# Patient Record
Sex: FEMALE | Race: WHITE | NOT HISPANIC OR LATINO | Employment: UNEMPLOYED | URBAN - METROPOLITAN AREA
[De-identification: names, ages, dates, MRNs, and addresses within clinical notes are randomized per-mention and may not be internally consistent; named-entity substitution may affect disease eponyms.]

---

## 2022-05-09 ENCOUNTER — TELEPHONE (OUTPATIENT)
Dept: GASTROENTEROLOGY | Facility: CLINIC | Age: 57
End: 2022-05-09
Payer: COMMERCIAL

## 2022-05-09 NOTE — TELEPHONE ENCOUNTER
----- Message from Mandi Malagon sent at 5/9/2022  2:12 PM CDT -----  Contact: PT  .Type:  Needs Medical Advice    Who Called:  Antione  Symptoms (please be specific):  abdominal/side pain    Would the patient rather a call back or a response via [facility]?  Callback   Best Call Back Number:  [phone] (home)     Additional Information:  former pt at [facility]- needed to know when last colonscopy appt and needed to schedule colonoscopy soon

## 2022-05-19 ENCOUNTER — TELEPHONE (OUTPATIENT)
Dept: GASTROENTEROLOGY | Facility: CLINIC | Age: 57
End: 2022-05-19
Payer: COMMERCIAL

## 2022-05-19 NOTE — TELEPHONE ENCOUNTER
----- Message from Marcella Boles sent at 5/19/2022  8:40 AM CDT -----  Regarding: DR ROJAS   Patient calling to schedule colonoscopy. Patient need to know when was her last colonoscopy. Call back number [phone] (home)

## 2023-01-05 ENCOUNTER — TELEPHONE (OUTPATIENT)
Dept: GASTROENTEROLOGY | Facility: CLINIC | Age: 58
End: 2023-01-05
Payer: COMMERCIAL

## 2023-01-05 DIAGNOSIS — R19.4 ALTERED BOWEL HABITS: ICD-10-CM

## 2023-01-05 DIAGNOSIS — N73.6 PELVIC ADHESIONS: Primary | ICD-10-CM

## 2023-01-05 NOTE — TELEPHONE ENCOUNTER
Pt called in to inquire when next colonoscopy is due,  Recall date in [facility] is 10/18/2023.  Pt states that she has been having swelling right above her naval. This has been ongoing since her last GI visit.   When she sits she feels like there is something stuck there.  Pt takes miralax daily and states she sometimes has diarrhea. She does hold miralax when this happens but if she doesn't start back taking it she has stomach pain.  Please advise.

## 2023-01-05 NOTE — TELEPHONE ENCOUNTER
----- Message from Lara oMck sent at 1/5/2023  8:32 AM CST -----  Regarding: pt advice  Contact: pt  Pt is calling to find out when she had her last colonoscopy. Please call back at [phone]//thank you acc

## 2023-01-06 NOTE — TELEPHONE ENCOUNTER
This patient has known significant scar tissue in her pelvis as a main source of her GI Sx. Make next available back up OV with me but I recommend a referral to PT for pelvic adhesions/health. Okay to send if she agrees. Hopefully she has completed the course of therapy by the time I see her in clinic and we can then schedule her repeat colonoscopy.  NBP

## 2023-08-14 ENCOUNTER — TELEPHONE (OUTPATIENT)
Dept: GASTROENTEROLOGY | Facility: CLINIC | Age: 58
End: 2023-08-14

## 2023-08-14 ENCOUNTER — OFFICE VISIT (OUTPATIENT)
Dept: GASTROENTEROLOGY | Facility: CLINIC | Age: 58
End: 2023-08-14
Payer: COMMERCIAL

## 2023-08-14 VITALS
HEIGHT: 66 IN | OXYGEN SATURATION: 99 % | SYSTOLIC BLOOD PRESSURE: 144 MMHG | WEIGHT: 142 LBS | HEART RATE: 74 BPM | BODY MASS INDEX: 22.82 KG/M2 | DIASTOLIC BLOOD PRESSURE: 67 MMHG

## 2023-08-14 DIAGNOSIS — R10.13 EPIGASTRIC PAIN: ICD-10-CM

## 2023-08-14 DIAGNOSIS — R10.32 LEFT LOWER QUADRANT PAIN: ICD-10-CM

## 2023-08-14 DIAGNOSIS — N73.6 PELVIC ADHESIONS: ICD-10-CM

## 2023-08-14 DIAGNOSIS — K66.0 ADHESION OF INTESTINE: Primary | ICD-10-CM

## 2023-08-14 DIAGNOSIS — Z86.010 HISTORY OF COLON POLYPS: ICD-10-CM

## 2023-08-14 DIAGNOSIS — R10.31 RIGHT LOWER QUADRANT PAIN: ICD-10-CM

## 2023-08-14 DIAGNOSIS — Z80.0 FAMILY HISTORY OF COLON CANCER: ICD-10-CM

## 2023-08-14 PROCEDURE — 99214 PR OFFICE/OUTPT VISIT, EST, LEVL IV, 30-39 MIN: ICD-10-PCS | Mod: S$GLB,,, | Performed by: INTERNAL MEDICINE

## 2023-08-14 PROCEDURE — 3008F PR BODY MASS INDEX (BMI) DOCUMENTED: ICD-10-PCS | Mod: CPTII,S$GLB,, | Performed by: INTERNAL MEDICINE

## 2023-08-14 PROCEDURE — 1159F MED LIST DOCD IN RCRD: CPT | Mod: CPTII,S$GLB,, | Performed by: INTERNAL MEDICINE

## 2023-08-14 PROCEDURE — 3077F SYST BP >= 140 MM HG: CPT | Mod: CPTII,S$GLB,, | Performed by: INTERNAL MEDICINE

## 2023-08-14 PROCEDURE — 3077F PR MOST RECENT SYSTOLIC BLOOD PRESSURE >= 140 MM HG: ICD-10-PCS | Mod: CPTII,S$GLB,, | Performed by: INTERNAL MEDICINE

## 2023-08-14 PROCEDURE — 1160F PR REVIEW ALL MEDS BY PRESCRIBER/CLIN PHARMACIST DOCUMENTED: ICD-10-PCS | Mod: CPTII,S$GLB,, | Performed by: INTERNAL MEDICINE

## 2023-08-14 PROCEDURE — 3078F DIAST BP <80 MM HG: CPT | Mod: CPTII,S$GLB,, | Performed by: INTERNAL MEDICINE

## 2023-08-14 PROCEDURE — 3078F PR MOST RECENT DIASTOLIC BLOOD PRESSURE < 80 MM HG: ICD-10-PCS | Mod: CPTII,S$GLB,, | Performed by: INTERNAL MEDICINE

## 2023-08-14 PROCEDURE — 1159F PR MEDICATION LIST DOCUMENTED IN MEDICAL RECORD: ICD-10-PCS | Mod: CPTII,S$GLB,, | Performed by: INTERNAL MEDICINE

## 2023-08-14 PROCEDURE — 3008F BODY MASS INDEX DOCD: CPT | Mod: CPTII,S$GLB,, | Performed by: INTERNAL MEDICINE

## 2023-08-14 PROCEDURE — 1160F RVW MEDS BY RX/DR IN RCRD: CPT | Mod: CPTII,S$GLB,, | Performed by: INTERNAL MEDICINE

## 2023-08-14 PROCEDURE — 99214 OFFICE O/P EST MOD 30 MIN: CPT | Mod: S$GLB,,, | Performed by: INTERNAL MEDICINE

## 2023-08-14 RX ORDER — SOD SULF/POT CHLORIDE/MAG SULF 1.479 G
12 TABLET ORAL DAILY
Qty: 24 TABLET | Refills: 0 | Status: SHIPPED | OUTPATIENT
Start: 2023-08-14 | End: 2024-01-18

## 2023-08-14 RX ORDER — ESTRADIOL 1 MG/1
3 TABLET ORAL
COMMUNITY
Start: 2023-05-29

## 2023-08-14 RX ORDER — LEVALBUTEROL TARTRATE 45 UG/1
2 AEROSOL, METERED ORAL EVERY 4 HOURS PRN
COMMUNITY
Start: 2023-05-17 | End: 2023-08-14

## 2023-08-14 RX ORDER — MONTELUKAST SODIUM 10 MG/1
10 TABLET ORAL
COMMUNITY
Start: 2023-06-05 | End: 2023-08-14

## 2023-08-14 NOTE — PROGRESS NOTES
Clinic Note    Reason for visit:  The primary encounter diagnosis was Adhesion of intestine. Diagnoses of Pelvic adhesions, Left lower quadrant pain, Right lower quadrant pain, Family history of colon cancer, History of colon polyps, and Epigastric pain were also pertinent to this visit.    PCP: Howard Sahu.       HPI:  This is a 58 y.o. female who was last seen in 2021. Patient with significant scar tissue in her pelvis as a main source of her GI symptoms. She was referred to PT for pelvic adhesions/health but the waiting list was one year so she has not seen them yet. She takes MiraLAX daily and has daily BM, loose stool. She states RLQ abdominal pain that is intermittent. Comes and goes that is chronic. She states for the last month, she has had LLQ pain that comes and goes. Feels like a knot. She also reports intermittent bloating and pressure in epigastric area.         11/2020 CTE: no GI, mild distension of colon to sigmoid.    Colonoscopy 10/18/2018: Extrinsic adhesions in pelvis. Normal mucosa was noted in the entire colon. Colonoscopy with propofol in 5 years.     Review of Systems   Constitutional:  Negative for fatigue, fever and unexpected weight change.   HENT:  Negative for mouth sores, postnasal drip, sore throat and trouble swallowing.    Eyes:  Negative for pain, discharge and eye dryness.   Respiratory:  Negative for apnea, cough, choking, chest tightness, shortness of breath and wheezing.    Cardiovascular:  Negative for chest pain, palpitations and leg swelling.   Gastrointestinal:  Positive for abdominal distention and abdominal pain. Negative for anal bleeding, blood in stool, change in bowel habit, constipation, diarrhea, nausea, rectal pain, vomiting, reflux, fecal incontinence and change in bowel habit.   Genitourinary:  Negative for bladder incontinence, dysuria and hematuria.   Musculoskeletal:  Negative for arthralgias, back pain and joint swelling.   Integumentary:  Negative for  "color change and rash.   Allergic/Immunologic: Negative for environmental allergies and food allergies.   Neurological:  Negative for seizures and headaches.   Hematological:  Negative for adenopathy. Does not bruise/bleed easily.        Past Medical History:   Diagnosis Date    Abdominal adhesions     History of bowel obstruction 2008    Hormone replacement therapy (HRT)     Personal history of colonic polyps      Past Surgical History:   Procedure Laterality Date    BILATERAL TUBAL LIGATION      CHOLECYSTECTOMY      HYSTERECTOMY      LYSIS OF ADHESIONS      LLQ and RLQ     Family History   Problem Relation Age of Onset    Lymphoma Mother     Breast cancer Mother     Colon cancer Father 55     Social History     Tobacco Use    Smoking status: Never    Smokeless tobacco: Never   Substance Use Topics    Alcohol use: Never    Drug use: Never     Review of patient's allergies indicates:  No Known Allergies     Medication List with Changes/Refills   New Medications    SOD SULF-POT CHLORIDE-MAG SULF (SUTAB) 1.479-0.188- 0.225 GRAM TABLET    Take 12 tablets by mouth once daily. Take according to package instructions with indicated amount of water. No breakfast day before test. May substitute with Suprep, Clenpiq, Plenvu, Moviprep or GoLytely based on Rx plan and patient preference.   Current Medications    ESTRADIOL (ESTRACE) 1 MG TABLET    Take 3 mg by mouth.   Discontinued Medications    LEVALBUTEROL (XOPENEX HFA) 45 MCG/ACTUATION INHALER    Inhale 2 puffs into the lungs every 4 (four) hours as needed.    MONTELUKAST (SINGULAIR) 10 MG TABLET    Take 10 mg by mouth.         Vital Signs:  BP (!) 144/67 (BP Location: Right arm, Patient Position: Sitting, BP Method: Medium (Automatic))   Pulse 74   Ht 5' 6" (1.676 m)   Wt 64.4 kg (142 lb)   SpO2 99%   BMI 22.92 kg/m²         Physical Exam  Vitals reviewed.   Constitutional:       General: She is awake. She is not in acute distress.     Appearance: Normal appearance. " She is well-developed. She is not ill-appearing, toxic-appearing or diaphoretic.   HENT:      Head: Normocephalic and atraumatic.      Nose: Nose normal.      Mouth/Throat:      Mouth: Mucous membranes are moist.      Pharynx: Oropharynx is clear. No oropharyngeal exudate or posterior oropharyngeal erythema.   Eyes:      General: Lids are normal. Gaze aligned appropriately. No scleral icterus.        Right eye: No discharge.         Left eye: No discharge.      Conjunctiva/sclera: Conjunctivae normal.   Neck:      Trachea: Trachea normal.   Cardiovascular:      Rate and Rhythm: Normal rate and regular rhythm.      Pulses:           Radial pulses are 2+ on the right side and 2+ on the left side.   Pulmonary:      Effort: Pulmonary effort is normal. No respiratory distress.      Breath sounds: No stridor. No wheezing.   Chest:      Chest wall: No tenderness.   Abdominal:      General: Bowel sounds are normal. There is no distension.      Palpations: Abdomen is soft. There is no fluid wave, hepatomegaly or mass.      Tenderness: There is no abdominal tenderness. There is no guarding or rebound.   Musculoskeletal:         General: No tenderness or deformity.      Cervical back: Full passive range of motion without pain and neck supple. No tenderness.      Right lower leg: No edema.      Left lower leg: No edema.   Lymphadenopathy:      Cervical: No cervical adenopathy.   Skin:     General: Skin is warm and dry.      Capillary Refill: Capillary refill takes less than 2 seconds.      Coloration: Skin is not cyanotic, jaundiced or pale.   Neurological:      General: No focal deficit present.      Mental Status: She is alert and oriented to person, place, and time.      Motor: No tremor.   Psychiatric:         Attention and Perception: Attention normal.         Mood and Affect: Mood and affect normal.         Speech: Speech normal.         Behavior: Behavior normal. Behavior is cooperative.            All of the data above  and below has been reviewed by myself and any further interpretations will be reflected in the assessment and plan.   The data includes review of external notes, and independent interpretation of lab results, procedures, x-rays, and imaging reports.      Assessment:  Adhesion of intestine  -     Ambulatory referral/consult to Physical/Occupational Therapy; Future; Expected date: 08/21/2023    Pelvic adhesions  -     Ambulatory referral/consult to Physical/Occupational Therapy; Future; Expected date: 08/21/2023    Left lower quadrant pain  -     Ambulatory Referral to External Surgery    Right lower quadrant pain  -     Ambulatory referral/consult to Physical/Occupational Therapy; Future; Expected date: 08/21/2023    Family history of colon cancer    History of colon polyps  -     Ambulatory Referral to External Surgery  -     sod sulf-pot chloride-mag sulf (SUTAB) 1.479-0.188- 0.225 gram tablet; Take 12 tablets by mouth once daily. Take according to package instructions with indicated amount of water. No breakfast day before test. May substitute with Suprep, Clenpiq, Plenvu, Moviprep or GoLytely based on Rx plan and patient preference.  Dispense: 24 tablet; Refill: 0    Epigastric pain  Comments:  newer, plan for EGD with colonoscopy  Orders:  -     Ambulatory Referral to External Surgery       Adhesion of intestine: appreciated on colonoscopy in 2018 to a significant degree, CTE 2020 some proximal dilation to sigmoid.   Right lower quadrant pain. Rec pelvic floor therapy.   Left lower quadrant pain  Screening for malignant neoplasms of colon: high risk. Due 10/2023  Family history of colon cancer     Recommendations:  Schedule upper endoscopy and colonoscopy.  We will send a referral to [facility].     Risks, benefits, and alternatives of medical management, any associated procedures, and/or treatment discussed with the patient. Patient given opportunity to ask questions and voices understanding.  Patient has elected to proceed with the recommended care modalities as discussed.    Instructed patient to notify my office if they have not been contacted within two weeks after any procedures, submitting any samples (biopsies, blood, stool, urine, etc.) or after any imaging (X-ray, CT, MRI, etc.).     Follow up in about 6 months (around 2/14/2024).    Order summary:  Orders Placed This Encounter   Procedures    Ambulatory referral/consult to Physical/Occupational Therapy    Ambulatory Referral to External Surgery      This assessment, plan, and documentation was performed in collaboration with Crissy Mott NP.      This document may have been created using a voice recognition transcribing system. Incorrect words or phrases may have been missed during proofreading. Please interpret accordingly or contact me for clarification.     Estefanía Cruz MD

## 2023-08-14 NOTE — LETTER
August 14, 2023        Howard Sahu MD  [address]  [place] [address]             [facility]  [address] [address]  Phone: [phone]  Fax: [phone]   Patient: Antione Nix   MR Number: [id number]   YOB: 1965   Date of Visit: 8/14/2023       Dear Dr. Sahu:    Thank you for referring Antione Nix to me for evaluation. Attached you will find relevant portions of my assessment and plan of care.    If you have questions, please do not hesitate to call me. I look forward to following Antione Nix along with you.    Sincerely,      Estefanía Cruz MD            CC  No Recipients    Enclosure

## 2023-08-14 NOTE — TELEPHONE ENCOUNTER
Per NBP scheduled pt at [facility] on 10/16/23 (Monday). Dottie      Faxed over pelvic therapy order with OV note with colonoscopy report to [facility].  [email or web address]

## 2023-08-14 NOTE — PATIENT INSTRUCTIONS
Schedule upper endoscopy and colonoscopy.  We will send a referral to [facility].     Please notify my office if you have not been contacted within two weeks after any procedures, submitting any samples (biopsies, blood, stool, urine, etc.) or after any imaging (X-ray, CT, MRI, etc.).

## 2023-10-06 ENCOUNTER — TELEPHONE (OUTPATIENT)
Dept: GASTROENTEROLOGY | Facility: CLINIC | Age: 58
End: 2023-10-06
Payer: COMMERCIAL

## 2023-10-06 VITALS — WEIGHT: 142 LBS | BODY MASS INDEX: 22.82 KG/M2 | HEIGHT: 66 IN

## 2023-10-06 DIAGNOSIS — R10.13 EPIGASTRIC PAIN: ICD-10-CM

## 2023-10-06 DIAGNOSIS — R10.32 LEFT LOWER QUADRANT PAIN: ICD-10-CM

## 2023-10-06 DIAGNOSIS — Z86.010 HISTORY OF COLON POLYPS: Primary | ICD-10-CM

## 2023-10-06 NOTE — TELEPHONE ENCOUNTER
"[facility]  [address] [address]  Phone: [phone]  Fax: [phone]    History & Physical         Provider: Dr. Estefanía Cruz    Patient Name: Antione CRONIN (age):1965  58 y.o.           Gender: female   Phone: [phone]     Referring Physician: Howard Sahu     Vital Signs:   Height - 5' 6"  Weight - 142 lb  BMI -  22.92    Plan: EGD/Colonoscopy @ [facility]    Encounter Diagnoses   Name Primary?    History of colon polyps Yes    Left lower quadrant pain     Epigastric pain            History:      Past Medical History:   Diagnosis Date    Abdominal adhesions     History of bowel obstruction     Hormone replacement therapy (HRT)     Personal history of colonic polyps       Past Surgical History:   Procedure Laterality Date    BILATERAL TUBAL LIGATION      CHOLECYSTECTOMY      HYSTERECTOMY      LYSIS OF ADHESIONS      LLQ and RLQ      Medication List with Changes/Refills   Current Medications    ESTRADIOL (ESTRACE) 1 MG TABLET    Take 3 mg by mouth.    SOD SULF-POT CHLORIDE-MAG SULF (SUTAB) 1.479-0.188- 0.225 GRAM TABLET    Take 12 tablets by mouth once daily. Take according to package instructions with indicated amount of water. No breakfast day before test. May substitute with Suprep, Clenpiq, Plenvu, Moviprep or GoLytely based on Rx plan and patient preference.      Review of patient's allergies indicates:  No Known Allergies   Family History   Problem Relation Age of Onset    Lymphoma Mother     Breast cancer Mother     Colon cancer Father 55      Social History     Tobacco Use    Smoking status: Never    Smokeless tobacco: Never   Substance Use Topics    Alcohol use: Never    Drug use: Never        Physical Examination:     General Appearance:___________________________  HEENT: " _____________________________________  Abdomen:____________________________________  Heart:________________________________________  Lungs:_______________________________________  Extremities:___________________________________  Skin:_________________________________________  Endocrine:____________________________________  Genitourinary:_________________________________  Neurological:__________________________________      Patient has been evaluated immediately prior to sedation and is medically cleared for endoscopy with IVCS as an ASA class: ______      Physician Signature: _________________________       Date: ________  Time: ________

## 2023-10-16 ENCOUNTER — OUTSIDE PLACE OF SERVICE (OUTPATIENT)
Dept: GASTROENTEROLOGY | Facility: CLINIC | Age: 58
End: 2023-10-16

## 2023-10-16 LAB — CRC RECOMMENDATION EXT: NORMAL

## 2023-10-16 PROCEDURE — 43239 EGD BIOPSY SINGLE/MULTIPLE: CPT | Mod: 51,,, | Performed by: INTERNAL MEDICINE

## 2023-10-16 PROCEDURE — 45385 PR COLONOSCOPY,REMV LESN,SNARE: ICD-10-PCS | Mod: ,,, | Performed by: INTERNAL MEDICINE

## 2023-10-16 PROCEDURE — 43239 PR EGD, FLEX, W/BIOPSY, SGL/MULTI: ICD-10-PCS | Mod: 51,,, | Performed by: INTERNAL MEDICINE

## 2023-10-16 PROCEDURE — 45385 COLONOSCOPY W/LESION REMOVAL: CPT | Mod: ,,, | Performed by: INTERNAL MEDICINE

## 2023-10-17 ENCOUNTER — TELEPHONE (OUTPATIENT)
Dept: GASTROENTEROLOGY | Facility: CLINIC | Age: 58
End: 2023-10-17
Payer: COMMERCIAL

## 2023-10-17 LAB — SPECIMEN TO PATHOLOGY: NORMAL

## 2023-10-17 NOTE — TELEPHONE ENCOUNTER
DBx nl, GBx react w/mini chr inact gitis w/o Hp, 1 TA, repeat colonoscopy in 5 years.     Notify patient. No signs of infection, precancerous cells or Celiac disease on the upper endoscopy biopsies.  Her colon polyp was benign. Repeat colonoscopy in 5 years.  Confirm recall tab in appointment desk is up-to-date (update if needed).  NBP

## 2023-11-07 ENCOUNTER — DOCUMENTATION ONLY (OUTPATIENT)
Dept: GASTROENTEROLOGY | Facility: CLINIC | Age: 58
End: 2023-11-07
Payer: COMMERCIAL

## 2023-12-14 ENCOUNTER — TELEPHONE (OUTPATIENT)
Dept: GASTROENTEROLOGY | Facility: CLINIC | Age: 58
End: 2023-12-14

## 2024-01-18 ENCOUNTER — OFFICE VISIT (OUTPATIENT)
Dept: GASTROENTEROLOGY | Facility: CLINIC | Age: 59
End: 2024-01-18
Payer: COMMERCIAL

## 2024-01-18 VITALS
HEIGHT: 66 IN | SYSTOLIC BLOOD PRESSURE: 111 MMHG | HEART RATE: 71 BPM | DIASTOLIC BLOOD PRESSURE: 75 MMHG | WEIGHT: 144.19 LBS | BODY MASS INDEX: 23.17 KG/M2 | OXYGEN SATURATION: 99 %

## 2024-01-18 DIAGNOSIS — K66.0 ADHESION OF INTESTINE: Primary | ICD-10-CM

## 2024-01-18 DIAGNOSIS — Z86.010 HISTORY OF COLON POLYPS: ICD-10-CM

## 2024-01-18 DIAGNOSIS — Z80.0 FAMILY HISTORY OF COLON CANCER: ICD-10-CM

## 2024-01-18 PROCEDURE — 3074F SYST BP LT 130 MM HG: CPT | Mod: CPTII,S$GLB,, | Performed by: INTERNAL MEDICINE

## 2024-01-18 PROCEDURE — 1160F RVW MEDS BY RX/DR IN RCRD: CPT | Mod: CPTII,S$GLB,, | Performed by: INTERNAL MEDICINE

## 2024-01-18 PROCEDURE — 1159F MED LIST DOCD IN RCRD: CPT | Mod: CPTII,S$GLB,, | Performed by: INTERNAL MEDICINE

## 2024-01-18 PROCEDURE — 99213 OFFICE O/P EST LOW 20 MIN: CPT | Mod: S$GLB,,, | Performed by: INTERNAL MEDICINE

## 2024-01-18 PROCEDURE — 3008F BODY MASS INDEX DOCD: CPT | Mod: CPTII,S$GLB,, | Performed by: INTERNAL MEDICINE

## 2024-01-18 PROCEDURE — 3078F DIAST BP <80 MM HG: CPT | Mod: CPTII,S$GLB,, | Performed by: INTERNAL MEDICINE

## 2024-01-18 RX ORDER — POLYETHYLENE GLYCOL 3350 17 G/17G
POWDER, FOR SOLUTION ORAL
COMMUNITY

## 2024-01-18 NOTE — LETTER
January 18, 2024        Howard Sahu MD  [address]  [place] [address]             [facility]  [address] [address]  Phone: [phone]  Fax: [phone]   Patient: Antione Nix   MR Number: [id number]   YOB: 1965   Date of Visit: 1/18/2024       Dear Dr. Sahu:    Thank you for referring Antione Nix to me for evaluation. Attached you will find relevant portions of my assessment and plan of care.    If you have questions, please do not hesitate to call me. I look forward to following Antione Nix along with you.    Sincerely,      Estefanía Cruz MD            CC  No Recipients    Enclosure

## 2024-01-18 NOTE — PROGRESS NOTES
Clinic Note    Reason for visit:  The primary encounter diagnosis was Adhesion of intestine. Diagnoses of History of colon polyps and Family history of colon cancer were also pertinent to this visit.    PCP: Howard Sahu.       HPI:  This is a 59 y.o. female here for follow up. Patient with pelvic adhesions, constipation. On MiraLax daily. Referral sent to [facility]. Same lower abd pain. We had reviewed low residue diet.    EGD/Colonoscopy 10/16/2023 DBx nl, GBx react w/mini chr inact gitis w/o Hp, 1 TA, diverticulosis repeat colonoscopy in 5 years.     11/2020 CTE: no GI, mild distension of colon to sigmoid.     Colonoscopy 10/18/2018: Extrinsic adhesions in pelvis. Normal mucosa was noted in the entire colon. Colonoscopy with propofol in 5 years    Review of Systems   Constitutional:  Negative for fatigue, fever and unexpected weight change.   HENT:  Negative for mouth sores, postnasal drip, sore throat and trouble swallowing.    Eyes:  Negative for pain, discharge and eye dryness.   Respiratory:  Negative for apnea, cough, choking, chest tightness, shortness of breath and wheezing.    Cardiovascular:  Negative for chest pain, palpitations and leg swelling.   Gastrointestinal:  Negative for abdominal distention, abdominal pain, anal bleeding, blood in stool, change in bowel habit, constipation, diarrhea, nausea, rectal pain, vomiting, reflux and fecal incontinence.   Genitourinary:  Negative for bladder incontinence, dysuria and hematuria.   Musculoskeletal:  Negative for arthralgias, back pain and joint swelling.   Integumentary:  Negative for color change and rash.   Allergic/Immunologic: Negative for environmental allergies and food allergies.   Neurological:  Negative for seizures and headaches.   Hematological:  Negative for adenopathy. Does not bruise/bleed easily.        Past Medical History:   Diagnosis Date    Abdominal adhesions     History of bowel obstruction 2008    Hormone  "replacement therapy (HRT)     Personal history of colonic polyps      Past Surgical History:   Procedure Laterality Date    BILATERAL TUBAL LIGATION      CHOLECYSTECTOMY      HYSTERECTOMY      LYSIS OF ADHESIONS      LLQ and RLQ     Family History   Problem Relation Age of Onset    Lymphoma Mother     Breast cancer Mother     Colon cancer Father 55     Social History     Tobacco Use    Smoking status: Never    Smokeless tobacco: Never   Substance Use Topics    Alcohol use: Never    Drug use: Never     Review of patient's allergies indicates:  No Known Allergies     Medication List with Changes/Refills   Current Medications    CALCIUM CARBONATE/VITAMIN D3 (VITAMIN D-3 ORAL)    Take by mouth.    ESTRADIOL (ESTRACE) 1 MG TABLET    Take 3 mg by mouth.    POLYETHYLENE GLYCOL (GLYCOLAX) 17 GRAM PWPK    Take by mouth.   Discontinued Medications    SOD SULF-POT CHLORIDE-MAG SULF (SUTAB) 1.479-0.188- 0.225 GRAM TABLET    Take 12 tablets by mouth once daily. Take according to package instructions with indicated amount of water. No breakfast day before test. May substitute with Suprep, Clenpiq, Plenvu, Moviprep or GoLytely based on Rx plan and patient preference.         Vital Signs:  /75   Pulse 71   Ht 5' 6" (1.676 m)   Wt 65.4 kg (144 lb 3.2 oz)   SpO2 99%   BMI 23.27 kg/m²         Physical Exam  Vitals reviewed.   Constitutional:       General: She is awake. She is not in acute distress.     Appearance: Normal appearance. She is well-developed. She is not ill-appearing, toxic-appearing or diaphoretic.   HENT:      Head: Normocephalic and atraumatic.      Nose: Nose normal.      Mouth/Throat:      Mouth: Mucous membranes are moist.      Pharynx: Oropharynx is clear. No oropharyngeal exudate or posterior oropharyngeal erythema.   Eyes:      General: Lids are normal. Gaze aligned appropriately. No scleral icterus.        Right eye: No discharge.         Left eye: No discharge.      Conjunctiva/sclera: Conjunctivae " normal.   Neck:      Trachea: Trachea normal.   Cardiovascular:      Rate and Rhythm: Normal rate and regular rhythm.      Pulses:           Radial pulses are 2+ on the right side and 2+ on the left side.   Pulmonary:      Effort: Pulmonary effort is normal. No respiratory distress.      Breath sounds: No stridor. No wheezing.   Chest:      Chest wall: No tenderness.   Abdominal:      General: Bowel sounds are normal. There is no distension.      Palpations: Abdomen is soft. There is no fluid wave, hepatomegaly or mass.      Tenderness: There is no abdominal tenderness. There is no guarding or rebound.   Musculoskeletal:         General: No tenderness or deformity.      Cervical back: Full passive range of motion without pain and neck supple. No tenderness.      Right lower leg: No edema.      Left lower leg: No edema.   Lymphadenopathy:      Cervical: No cervical adenopathy.   Skin:     General: Skin is warm and dry.      Capillary Refill: Capillary refill takes less than 2 seconds.      Coloration: Skin is not cyanotic, jaundiced or pale.   Neurological:      General: No focal deficit present.      Mental Status: She is alert and oriented to person, place, and time.      Motor: No tremor.   Psychiatric:         Attention and Perception: Attention normal.         Mood and Affect: Mood and affect normal.         Speech: Speech normal.         Behavior: Behavior normal. Behavior is cooperative.            All of the data above and below has been reviewed by myself and any further interpretations will be reflected in the assessment and plan.   The data includes review of external notes, and independent interpretation of lab results, procedures, x-rays, and imaging reports.      Assessment:  Adhesion of intestine    History of colon polyps    Family history of colon cancer    She is unclear if low residue diet is really helping. Takes MiraLAX daily. On list for PT.  Due for repeat colonoscopy 10/2028.      Recommendations:    Notify me if you abdominal symptoms change or worsen.    Risks, benefits, and alternatives of medical management, any associated procedures, and/or treatment discussed with the patient. Patient given opportunity to ask questions and voices understanding. Patient has elected to proceed with the recommended care modalities as discussed.    Instructed patient to notify my office if they have not been contacted within two weeks after any procedures, submitting any samples (biopsies, blood, stool, urine, etc.) or after any imaging (X-ray, CT, MRI, etc.).     Follow up if symptoms worsen or fail to improve.    Order summary:  No orders of the defined types were placed in this encounter.     This document may have been created using a voice recognition transcribing system. Incorrect words or phrases may have been missed during proofreading. Please interpret accordingly or contact me for clarification.     Estefanía Cruz MD

## 2024-07-26 ENCOUNTER — TELEPHONE (OUTPATIENT)
Dept: GASTROENTEROLOGY | Facility: CLINIC | Age: 59
End: 2024-07-26
Payer: COMMERCIAL

## 2024-07-26 NOTE — TELEPHONE ENCOUNTER
Called and spoke with Ms.Antione, patient was calling in regards to her  becoming a patient here so I did tell her that he would need a referral from his PCP first and she said that they would call and get one sent  he is having problems with his hemorrhoids.  [facility]

## 2024-07-26 NOTE — TELEPHONE ENCOUNTER
----- Message from Janell Siegel sent at 7/26/2024 12:24 PM CDT -----  Contact: pt  Pt returning a call and can be reached at [phone]    Thanks,